# Patient Record
Sex: MALE | Race: BLACK OR AFRICAN AMERICAN | NOT HISPANIC OR LATINO | Employment: UNEMPLOYED | URBAN - METROPOLITAN AREA
[De-identification: names, ages, dates, MRNs, and addresses within clinical notes are randomized per-mention and may not be internally consistent; named-entity substitution may affect disease eponyms.]

---

## 2017-07-18 ENCOUNTER — CLINICAL SUPPORT (OUTPATIENT)
Dept: SMOKING CESSATION | Facility: CLINIC | Age: 62
End: 2017-07-18
Payer: COMMERCIAL

## 2017-07-18 VITALS — SYSTOLIC BLOOD PRESSURE: 116 MMHG | DIASTOLIC BLOOD PRESSURE: 87 MMHG | HEART RATE: 68 BPM

## 2017-07-18 DIAGNOSIS — F17.210 VERY HEAVY CIGARETTE SMOKER (40 OR MORE PER DAY): Primary | ICD-10-CM

## 2017-07-18 PROCEDURE — 99404 PREV MED CNSL INDIV APPRX 60: CPT | Mod: S$GLB,,,

## 2017-07-18 PROCEDURE — 99999 PR PBB SHADOW E&M-NEW PATIENT-LVL II: CPT | Mod: PBBFAC,,,

## 2017-07-18 RX ORDER — BUPROPION HYDROCHLORIDE 150 MG/1
150 TABLET, EXTENDED RELEASE ORAL 2 TIMES DAILY
Qty: 60 TABLET | Refills: 0 | Status: SHIPPED | OUTPATIENT
Start: 2017-07-18 | End: 2018-07-18

## 2017-07-18 RX ORDER — IBUPROFEN 200 MG
1 TABLET ORAL DAILY
Qty: 14 PATCH | Refills: 0 | Status: SHIPPED | OUTPATIENT
Start: 2017-07-18

## 2017-07-18 NOTE — PROGRESS NOTES
Patient here for 1st quit attempt. States he smokes 3 packs daily.  Has Hx of depression. Is non- [facility] patient. Reviewed program goals. Agreed to participate in weekly or biweekly  tobacco cessation individual  sessions. Will begin the prescribed tobacco cessation medication regime of Zyban 150 mg and 21 mg nicotine patch. Educated patient how to use and side effects of medications.

## 2017-07-18 NOTE — Clinical Note
Patient here for 1st quit attempt. States he smokes 3 packs daily.  Has Hx of depression. Is non- [facility] patient. medical history obtain, please review.Reviewed program goals. Agreed to participate in weekly or biweekly  tobacco cessation individual  sessions. Will begin the prescribed tobacco cessation medication regime of Zyban 150 mg and 21 mg nicotine patch. Educated patient how to use and side effects of medications.

## 2017-07-24 ENCOUNTER — CLINICAL SUPPORT (OUTPATIENT)
Dept: SMOKING CESSATION | Facility: CLINIC | Age: 62
End: 2017-07-24
Payer: COMMERCIAL

## 2017-07-24 DIAGNOSIS — F17.210 SMOKES 1/2 PACK/DAY OR LESS: Primary | ICD-10-CM

## 2017-07-24 PROCEDURE — 99403 PREV MED CNSL INDIV APPRX 45: CPT | Mod: S$GLB,,,

## 2017-07-24 NOTE — Clinical Note
Patient here for first follow up. Reports he smokes 10 per day. Discussed learned addiction model, personal reasons for quitting, medications, goals, quit date. Will think about quit date. Rate faded to 8 per day along with strategies. Has not started Wellbutrin. Re-educated patient on use and side effects of medication. Advised he to start taking it today. The patient denies any abnormal behavioral or mental changes at this time.

## 2017-07-24 NOTE — PROGRESS NOTES
Individual Follow-Up Form    7/24/2017    Quit Date: TBD    Clinical Status of Patient: Outpatient    Length of Service: 45 minutes    Continuing Medication: yes  Patches    Other Medications: Wellbutrin     Target Symptoms: Withdrawal and medication side effects. The following were  rated moderate (3) to severe (4) on TCRS:  · Moderate (3): desire  · Severe (4): none     Comments: Patient here for first follow up. Reports he smokes 10 per day. Discussed learned addiction model, personal reasons for quitting, medications, goals, quit date. Will think about quit date. Rate faded to 8 per day along with strategies. Has not started Wellbutrin. Re-educated patient on use and side effects of medication. Advised he to start taking it today. The patient denies any abnormal behavioral or mental changes at this time.       Diagnosis: F17.210    Next Visit: 1 week

## 2017-08-02 ENCOUNTER — CLINICAL SUPPORT (OUTPATIENT)
Dept: SMOKING CESSATION | Facility: CLINIC | Age: 62
End: 2017-08-02
Payer: COMMERCIAL

## 2017-08-02 DIAGNOSIS — F17.210 SMOKES 1/2 PACK/DAY OR LESS: Primary | ICD-10-CM

## 2017-08-02 PROCEDURE — 99407 BEHAV CHNG SMOKING > 10 MIN: CPT | Mod: S$GLB,,,

## 2017-09-28 ENCOUNTER — TELEPHONE (OUTPATIENT)
Dept: SMOKING CESSATION | Facility: CLINIC | Age: 62
End: 2017-09-28

## 2017-09-28 NOTE — TELEPHONE ENCOUNTER
Successful contact at [phone]. Following up on patient quit status. States he is not doing well and has personal problem at this time. Has stop taking NRT.  Would like to get problems under control and then stat medication again. Attempt to reviewed strategies to help control stress but patient was not interested at this time. Will follow up in 2 weeks.

## 2018-05-23 ENCOUNTER — TELEPHONE (OUTPATIENT)
Dept: SMOKING CESSATION | Facility: CLINIC | Age: 63
End: 2018-05-23

## 2018-06-04 ENCOUNTER — CLINICAL SUPPORT (OUTPATIENT)
Dept: SMOKING CESSATION | Facility: CLINIC | Age: 63
End: 2018-06-04
Payer: COMMERCIAL

## 2018-06-04 DIAGNOSIS — F17.200 NICOTINE DEPENDENCE: Primary | ICD-10-CM

## 2018-06-04 PROCEDURE — 99407 BEHAV CHNG SMOKING > 10 MIN: CPT | Mod: S$GLB,,, | Performed by: INTERNAL MEDICINE

## 2018-06-04 NOTE — PROGRESS NOTES
Spoke with patient today in regard to smoking cessation progress 3/6 month follow up, he states not tobacco free at this time. Patient states he is doing well and currently using Chantix without any negative side effects. Patient not ready to return to the program at this time. Informed patient of benefit period, a follow up at 1 year, and contact information should any further help or support is needed. Will complete smart form for 3/6 month follow up on Quit attempt #1.

## 2018-07-30 ENCOUNTER — CLINICAL SUPPORT (OUTPATIENT)
Dept: SMOKING CESSATION | Facility: CLINIC | Age: 63
End: 2018-07-30
Payer: COMMERCIAL

## 2018-07-30 DIAGNOSIS — F17.200 NICOTINE DEPENDENCE: Primary | ICD-10-CM

## 2018-07-30 PROCEDURE — 99407 BEHAV CHNG SMOKING > 10 MIN: CPT | Mod: S$GLB,,, | Performed by: INTERNAL MEDICINE

## 2018-07-30 NOTE — PROGRESS NOTES
Spoke with patient today in regard to smoking cessation progress for 1 year follow up, he states not tobacco free. Patient states he is doing some test and about to have surgery, when done will call to return to the program. Informed patient of benefit period and contact information if any further help or support is needed. Will resolve episode and complete smart form for Quit attempt #1.

## 2020-01-21 DIAGNOSIS — R91.8 LUNG NODULES: Primary | ICD-10-CM

## 2020-01-29 ENCOUNTER — HOSPITAL ENCOUNTER (OUTPATIENT)
Dept: RADIOLOGY | Facility: HOSPITAL | Age: 65
Discharge: HOME | End: 2020-01-29
Attending: ANESTHESIOLOGY
Payer: COMMERCIAL

## 2020-01-29 VITALS — HEIGHT: 65 IN | WEIGHT: 280 LBS | BODY MASS INDEX: 46.65 KG/M2

## 2020-01-29 DIAGNOSIS — R91.8 LUNG NODULES: ICD-10-CM

## 2020-01-29 LAB — GLUCOSE SERPL-MCNC: 91 MG/DL (ref 70–110)

## 2020-01-29 PROCEDURE — 78815 PET IMAGE W/CT SKULL-THIGH: CPT | Mod: TC,PO

## 2020-01-29 PROCEDURE — A9552 F18 FDG: HCPCS | Mod: PO
